# Patient Record
Sex: FEMALE | Race: BLACK OR AFRICAN AMERICAN | NOT HISPANIC OR LATINO | Employment: UNEMPLOYED | URBAN - METROPOLITAN AREA
[De-identification: names, ages, dates, MRNs, and addresses within clinical notes are randomized per-mention and may not be internally consistent; named-entity substitution may affect disease eponyms.]

---

## 2023-01-01 ENCOUNTER — HOSPITAL ENCOUNTER (INPATIENT)
Dept: DATA CONVERSION | Facility: HOSPITAL | Age: 63
LOS: 5 days | Discharge: HOME | End: 2023-09-29
Attending: EMERGENCY MEDICINE | Admitting: INTERNAL MEDICINE
Payer: COMMERCIAL

## 2023-01-01 VITALS — BODY MASS INDEX: 30.45 KG/M2 | WEIGHT: 194 LBS | HEIGHT: 67 IN

## 2023-01-01 DIAGNOSIS — R29.6 REPEATED FALLS: ICD-10-CM
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
## 2023-01-01 DIAGNOSIS — R52 PAIN, UNSPECIFIED: ICD-10-CM

## 2023-01-01 DIAGNOSIS — C79.49 SECONDARY MALIGNANT NEOPLASM OF OTHER PARTS OF NERVOUS SYSTEM (MULTI): ICD-10-CM

## 2023-01-01 LAB
ALBUMIN (G/DL) IN SER/PLAS: 3.6 G/DL (ref 3.4–5)
ALBUMIN (G/DL) IN SER/PLAS: 3.7 G/DL (ref 3.4–5)
ALBUMIN (G/DL) IN SER/PLAS: 3.8 G/DL (ref 3.4–5)
ANION GAP IN SER/PLAS: 12 MMOL/L (ref 10–20)
ANION GAP IN SER/PLAS: 14 MMOL/L (ref 10–20)
ANION GAP IN SER/PLAS: 17 MMOL/L (ref 10–20)
APPEARANCE, URINE: CLEAR
BASOPHILS (10*3/UL) IN BLOOD BY AUTOMATED COUNT: 0.03 X10E9/L (ref 0–0.1)
BASOPHILS/100 LEUKOCYTES IN BLOOD BY AUTOMATED COUNT: 0.2 % (ref 0–2)
BILIRUBIN, URINE: NEGATIVE
BLOOD, URINE: NEGATIVE
CALCIUM (MG/DL) IN SER/PLAS: 9.5 MG/DL (ref 8.6–10.6)
CALCIUM (MG/DL) IN SER/PLAS: 9.7 MG/DL (ref 8.6–10.6)
CALCIUM (MG/DL) IN SER/PLAS: 9.7 MG/DL (ref 8.6–10.6)
CARBON DIOXIDE, TOTAL (MMOL/L) IN SER/PLAS: 30 MMOL/L (ref 21–32)
CARBON DIOXIDE, TOTAL (MMOL/L) IN SER/PLAS: 30 MMOL/L (ref 21–32)
CARBON DIOXIDE, TOTAL (MMOL/L) IN SER/PLAS: 37 MMOL/L (ref 21–32)
CHLORIDE (MMOL/L) IN SER/PLAS: 97 MMOL/L (ref 98–107)
CHLORIDE (MMOL/L) IN SER/PLAS: 99 MMOL/L (ref 98–107)
CHLORIDE (MMOL/L) IN SER/PLAS: 99 MMOL/L (ref 98–107)
COLOR, URINE: YELLOW
CREATININE (MG/DL) IN SER/PLAS: 0.58 MG/DL (ref 0.5–1.05)
CREATININE (MG/DL) IN SER/PLAS: 0.63 MG/DL (ref 0.5–1.05)
CREATININE (MG/DL) IN SER/PLAS: 0.74 MG/DL (ref 0.5–1.05)
EOSINOPHILS (10*3/UL) IN BLOOD BY AUTOMATED COUNT: 0.1 X10E9/L (ref 0–0.7)
EOSINOPHILS/100 LEUKOCYTES IN BLOOD BY AUTOMATED COUNT: 0.7 % (ref 0–6)
ERYTHROCYTE DISTRIBUTION WIDTH (RATIO) BY AUTOMATED COUNT: 12.1 % (ref 11.5–14.5)
ERYTHROCYTE DISTRIBUTION WIDTH (RATIO) BY AUTOMATED COUNT: 12.4 % (ref 11.5–14.5)
ERYTHROCYTE DISTRIBUTION WIDTH (RATIO) BY AUTOMATED COUNT: 12.6 % (ref 11.5–14.5)
ERYTHROCYTE MEAN CORPUSCULAR HEMOGLOBIN CONCENTRATION (G/DL) BY AUTOMATED: 33.5 G/DL (ref 32–36)
ERYTHROCYTE MEAN CORPUSCULAR HEMOGLOBIN CONCENTRATION (G/DL) BY AUTOMATED: 34.8 G/DL (ref 32–36)
ERYTHROCYTE MEAN CORPUSCULAR HEMOGLOBIN CONCENTRATION (G/DL) BY AUTOMATED: 35.2 G/DL (ref 32–36)
ERYTHROCYTE MEAN CORPUSCULAR VOLUME (FL) BY AUTOMATED COUNT: 89 FL (ref 80–100)
ERYTHROCYTE MEAN CORPUSCULAR VOLUME (FL) BY AUTOMATED COUNT: 91 FL (ref 80–100)
ERYTHROCYTE MEAN CORPUSCULAR VOLUME (FL) BY AUTOMATED COUNT: 92 FL (ref 80–100)
ERYTHROCYTES (10*6/UL) IN BLOOD BY AUTOMATED COUNT: 4.99 X10E12/L (ref 4–5.2)
ERYTHROCYTES (10*6/UL) IN BLOOD BY AUTOMATED COUNT: 5.16 X10E12/L (ref 4–5.2)
ERYTHROCYTES (10*6/UL) IN BLOOD BY AUTOMATED COUNT: 5.42 X10E12/L (ref 4–5.2)
GFR FEMALE: >90 ML/MIN/1.73M2
GLUCOSE (MG/DL) IN SER/PLAS: 174 MG/DL (ref 74–99)
GLUCOSE (MG/DL) IN SER/PLAS: 183 MG/DL (ref 74–99)
GLUCOSE (MG/DL) IN SER/PLAS: 245 MG/DL (ref 74–99)
GLUCOSE, URINE: NEGATIVE MG/DL
HEMATOCRIT (%) IN BLOOD BY AUTOMATED COUNT: 44.6 % (ref 36–46)
HEMATOCRIT (%) IN BLOOD BY AUTOMATED COUNT: 47.5 % (ref 36–46)
HEMATOCRIT (%) IN BLOOD BY AUTOMATED COUNT: 49.2 % (ref 36–46)
HEMOGLOBIN (G/DL) IN BLOOD: 15.5 G/DL (ref 12–16)
HEMOGLOBIN (G/DL) IN BLOOD: 15.9 G/DL (ref 12–16)
HEMOGLOBIN (G/DL) IN BLOOD: 17.3 G/DL (ref 12–16)
IMMATURE GRANULOCYTES/100 LEUKOCYTES IN BLOOD BY AUTOMATED COUNT: 1.6 % (ref 0–0.9)
KETONES, URINE: NEGATIVE MG/DL
LEUKOCYTE ESTERASE, URINE: NEGATIVE
LEUKOCYTES (10*3/UL) IN BLOOD BY AUTOMATED COUNT: 14 X10E9/L (ref 4.4–11.3)
LEUKOCYTES (10*3/UL) IN BLOOD BY AUTOMATED COUNT: 19.4 X10E9/L (ref 4.4–11.3)
LEUKOCYTES (10*3/UL) IN BLOOD BY AUTOMATED COUNT: 19.8 X10E9/L (ref 4.4–11.3)
LYMPHOCYTES (10*3/UL) IN BLOOD BY AUTOMATED COUNT: 0.96 X10E9/L (ref 1.2–4.8)
LYMPHOCYTES/100 LEUKOCYTES IN BLOOD BY AUTOMATED COUNT: 6.9 % (ref 13–44)
MAGNESIUM (MG/DL) IN SER/PLAS: 2.12 MG/DL (ref 1.6–2.4)
MAGNESIUM (MG/DL) IN SER/PLAS: 2.15 MG/DL (ref 1.6–2.4)
MONOCYTES (10*3/UL) IN BLOOD BY AUTOMATED COUNT: 0.67 X10E9/L (ref 0.1–1)
MONOCYTES/100 LEUKOCYTES IN BLOOD BY AUTOMATED COUNT: 4.8 % (ref 2–10)
NEUTROPHILS (10*3/UL) IN BLOOD BY AUTOMATED COUNT: 11.97 X10E9/L (ref 1.2–7.7)
NEUTROPHILS/100 LEUKOCYTES IN BLOOD BY AUTOMATED COUNT: 85.8 % (ref 40–80)
NITRITE, URINE: NEGATIVE
NRBC (PER 100 WBCS) BY AUTOMATED COUNT: 0 /100 WBC (ref 0–0)
PATIENT TEMPERATURE: 37 DEGREES C
PH, URINE: 6 (ref 5–8)
PHOSPHATE (MG/DL) IN SER/PLAS: 2.9 MG/DL (ref 2.5–4.9)
PHOSPHATE (MG/DL) IN SER/PLAS: 3.5 MG/DL (ref 2.5–4.9)
PHOSPHATE (MG/DL) IN SER/PLAS: 3.8 MG/DL (ref 2.5–4.9)
PLATELETS (10*3/UL) IN BLOOD AUTOMATED COUNT: 257 X10E9/L (ref 150–450)
PLATELETS (10*3/UL) IN BLOOD AUTOMATED COUNT: 276 X10E9/L (ref 150–450)
PLATELETS (10*3/UL) IN BLOOD AUTOMATED COUNT: 311 X10E9/L (ref 150–450)
POCT ANION GAP, VENOUS: 4 MMOL/L (ref 10–25)
POCT BASE EXCESS, VENOUS: 12.6 MMOL/L (ref -2–3)
POCT CHLORIDE, VENOUS: 98 MMOL/L (ref 98–107)
POCT GLUCOSE, VENOUS: 257 MG/DL (ref 74–99)
POCT GLUCOSE: 174 MG/DL (ref 74–99)
POCT GLUCOSE: 271 MG/DL (ref 74–99)
POCT GLUCOSE: 281 MG/DL (ref 74–99)
POCT HCO3, VENOUS: 39.1 MMOL/L (ref 22–26)
POCT HEMATOCRIT, VENOUS: 46 % (ref 36–46)
POCT HEMOGLOBIN, VENOUS: 15.4 G/DL (ref 12–16)
POCT IONIZED CALCIUM, VENOUS: 1.2 MMOL/L (ref 1.1–1.33)
POCT LACTATE, VENOUS: 1.4 MMOL/L (ref 0.4–2)
POCT OXY HEMOGLOBIN, VENOUS: 44.6 % (ref 45–75)
POCT PCO2, VENOUS: 55 MMHG (ref 41–51)
POCT PH, VENOUS: 7.46 (ref 7.33–7.43)
POCT PO2, VENOUS: 33 MMHG (ref 35–45)
POCT POTASSIUM, VENOUS: 3.8 MMOL/L (ref 3.5–5.3)
POCT SO2, VENOUS: 46 % (ref 45–75)
POCT SODIUM, VENOUS: 137 MMOL/L (ref 136–145)
POTASSIUM (MMOL/L) IN SER/PLAS: 4.4 MMOL/L (ref 3.5–5.3)
POTASSIUM (MMOL/L) IN SER/PLAS: 4.5 MMOL/L (ref 3.5–5.3)
POTASSIUM (MMOL/L) IN SER/PLAS: 4.5 MMOL/L (ref 3.5–5.3)
PROTEIN, URINE: NEGATIVE MG/DL
SODIUM (MMOL/L) IN SER/PLAS: 139 MMOL/L (ref 136–145)
SODIUM (MMOL/L) IN SER/PLAS: 141 MMOL/L (ref 136–145)
SODIUM (MMOL/L) IN SER/PLAS: 141 MMOL/L (ref 136–145)
SPECIFIC GRAVITY, URINE: 1.01 (ref 1–1.03)
UREA NITROGEN (MG/DL) IN SER/PLAS: 16 MG/DL (ref 6–23)
UREA NITROGEN (MG/DL) IN SER/PLAS: 17 MG/DL (ref 6–23)
UREA NITROGEN (MG/DL) IN SER/PLAS: 18 MG/DL (ref 6–23)
UROBILINOGEN, URINE: <2 MG/DL (ref 0–1.9)

## 2023-01-01 RX ORDER — PANTOPRAZOLE SODIUM 40 MG/1
40 TABLET, DELAYED RELEASE ORAL DAILY
Status: DISCONTINUED | OUTPATIENT
Start: 2023-01-01 | End: 2023-01-01 | Stop reason: HOSPADM

## 2023-01-01 RX ORDER — INSULIN LISPRO 100 [IU]/ML
6 INJECTION, SOLUTION INTRAVENOUS; SUBCUTANEOUS
Status: DISCONTINUED | OUTPATIENT
Start: 2023-01-01 | End: 2023-01-01

## 2023-01-01 RX ORDER — SULFAMETHOXAZOLE AND TRIMETHOPRIM 800; 160 MG/1; MG/1
160 TABLET ORAL 3 TIMES WEEKLY
Status: DISCONTINUED | OUTPATIENT
Start: 2023-10-02 | End: 2023-01-01 | Stop reason: HOSPADM

## 2023-01-01 RX ORDER — HYDROCHLOROTHIAZIDE 25 MG/1
12.5 TABLET ORAL DAILY
Status: DISCONTINUED | OUTPATIENT
Start: 2023-01-01 | End: 2023-01-01 | Stop reason: HOSPADM

## 2023-01-01 RX ORDER — POLYETHYLENE GLYCOL 3350 17 G/17G
34 POWDER, FOR SOLUTION ORAL 2 TIMES DAILY
Status: DISCONTINUED | OUTPATIENT
Start: 2023-01-01 | End: 2023-01-01 | Stop reason: HOSPADM

## 2023-01-01 RX ORDER — INSULIN LISPRO 100 [IU]/ML
0-15 INJECTION, SOLUTION INTRAVENOUS; SUBCUTANEOUS EVERY 6 HOURS
Status: DISCONTINUED | OUTPATIENT
Start: 2023-01-01 | End: 2023-01-01

## 2023-01-01 RX ORDER — SENNOSIDES 8.6 MG/1
1 TABLET ORAL 2 TIMES DAILY PRN
Status: DISCONTINUED | OUTPATIENT
Start: 2023-01-01 | End: 2023-01-01 | Stop reason: HOSPADM

## 2023-01-01 RX ORDER — OLANZAPINE 10 MG/2ML
10 INJECTION, POWDER, FOR SOLUTION INTRAMUSCULAR ONCE AS NEEDED
Status: DISCONTINUED | OUTPATIENT
Start: 2023-01-01 | End: 2023-01-01 | Stop reason: HOSPADM

## 2023-01-01 RX ORDER — LISINOPRIL 10 MG/1
10 TABLET ORAL DAILY
Status: DISCONTINUED | OUTPATIENT
Start: 2023-01-01 | End: 2023-01-01 | Stop reason: HOSPADM

## 2023-01-01 RX ORDER — IBUPROFEN 200 MG
1 TABLET ORAL DAILY
Status: DISCONTINUED | OUTPATIENT
Start: 2023-01-01 | End: 2023-01-01 | Stop reason: HOSPADM

## 2023-01-01 RX ORDER — FUROSEMIDE 40 MG/1
40 TABLET ORAL DAILY
Status: DISCONTINUED | OUTPATIENT
Start: 2023-01-01 | End: 2023-01-01 | Stop reason: HOSPADM

## 2023-01-01 RX ORDER — DEXAMETHASONE 4 MG/1
6 TABLET ORAL EVERY 12 HOURS SCHEDULED
Status: DISCONTINUED | OUTPATIENT
Start: 2023-01-01 | End: 2023-01-01 | Stop reason: HOSPADM

## 2023-01-01 RX ORDER — ATORVASTATIN CALCIUM 40 MG/1
40 TABLET, FILM COATED ORAL EVERY 24 HOURS
Status: DISCONTINUED | OUTPATIENT
Start: 2023-01-01 | End: 2023-01-01 | Stop reason: HOSPADM

## 2023-01-01 RX ORDER — DEXTROSE 50 % IN WATER (D50W) INTRAVENOUS SYRINGE
25
Status: DISCONTINUED | OUTPATIENT
Start: 2023-01-01 | End: 2023-01-01 | Stop reason: HOSPADM

## 2023-01-01 RX ORDER — ACETAMINOPHEN 325 MG/1
650 TABLET ORAL EVERY 4 HOURS PRN
Status: DISCONTINUED | OUTPATIENT
Start: 2023-01-01 | End: 2023-01-01 | Stop reason: HOSPADM

## 2023-01-01 RX ORDER — QUETIAPINE FUMARATE 25 MG/1
12.5 TABLET, FILM COATED ORAL 3 TIMES DAILY PRN
Status: DISCONTINUED | OUTPATIENT
Start: 2023-01-01 | End: 2023-01-01 | Stop reason: HOSPADM

## 2023-09-30 NOTE — PROGRESS NOTES
"        Service: Hematology - Oncology     Subjective Data:   NADER MAHMOOD is a 63 year old Female who is Hospital Day # 2.     No acute events overnight.  Patient seen and evaluated this morning, no acute distress sitting in bed.  She appears confused, is ANO x2 on exam.  She reports that at home she was feeling \"sore\" which  is why she was brought back into the hospital.  Story was corroborated with family on rounds, they state she had 2 falls in the bathroom, her  was unable to take care off her medications, and that she had an approximately 5-minute episode of right  arm shaking which prompted them to call the EMS.  Patient denies any chest pain, shortness of breath, abdominal pain, nausea or vomiting.  Continues to endorse weakness in right upper and lower extremity.  Denies hitting her head, or any headaches.    Objective Data:     Objective Information:      T   P  R  BP   MAP  SpO2   Value  36.6  63  18  126/76      99%  Date/Time 9/25 10:52 9/25 10:52 9/25 10:52 9/25 10:52    9/25 10:52  Range  (35.6C - 36.9C )  (60 - 73 )  (16 - 18 )  (126 - 174 )/ (71 - 90 )    (93% - 100% )  Highest temp of 36.9 C was recorded at 9/25 1:21      Pain reported at 9/25 8:41: 0  Pain reported at 9/25 8:41: 0 = None    Physical Exam Narrative:  ·  Physical Exam:    Constitutional:  Sitting in bed in NAD, awake  CV: RRR, no murmurs, rubs, or gallops  Pulm: CTAB, no increased WOB  GI: abd soft, NT, ND  Ext: bilateral LE without pitting edema  Neuro: 0/5 strength RUE, 5/5 LUE, 4/5 RLE , 5/5 LLE. Sensation intact bilaterally, cranial nerves intact.   Psych: affect appropriate, ANO x2,  does not know date and was perseverating on this throughout the interview afterwards, redirectable with questioning    Recent Lab Results:    Results:    CBC: 9/24/2023 17:00              \     Hgb     /                              \     15.5       /  WBC  ----------------  Plt               14.0 H    ----------------    311            "   /     Hct     \                              /     44.6       \            RBC: 4.99     MCV: 89     Neutrophil %: 85.8      RFP: 9/24/2023 17:00  NA+        Cl-     BUN  /                         141    97 L   16  /  --------------------------------  Glucose                ---------------------------  245 H    K+     HCO3-   Creat \                         4.5    37 H   0.74  \  Calcium : 9.7Anion Gap : 12          Albumin : 3.8     Phos : 2.9      Assessment and Plan:   Code Status:  ·  Code Status Full Code     Assessment:    63F PMHx HTN, DM2, HLD, tobacco use who was recently admitted for workup of suspected new lung malignancy (prelim biopsy result NSCLC)  and 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 S/p LP during prior admission to assess for leptomeningeal disease. Discharged on dexamethasone 4q8, will continue;  likely needs PT/OT assessment and either discharge with confirmed homecare or SNF placement.     Updates 9/25:  - Endocrinology consulted for management of blood glucose in setting of steroid taper, appreciate recommendations  - Radiation oncology consulted for continuation of radiation to spine and brain, appreciate recommendations  - Patient scheduled for radiation at 1330  - Neurology consulted for potential seizures in setting of brain metastases, appreciate recommendations  - Video continuous EEG ordered    #Brain metastatic lesions  #Likely RUL NSCLC  #Concern for seizures  :: 9/12 OSH CT head w/o contrast stable intracranial numerous hypodense lesions throughout brain with surrounding edema suspicious for hyperdense/hemorrhagic metastasis  ::9/12 OSH CTAP  2.8 x 2.7 x 3.3 cm cavitary mass with geographic rim of ground-glass attenuation in right upper lobe suspicious for primary lung neoplasm. Several mildly prominent subcentimeter R hilar lymph nodes.  ::9/13 OSH MRI  numerous  peripherally enhancing, intra-axial masses in bilateral cerebral hemispheres and cerebellar hemispheres consistent with metastatic disease largest measuring up to 2cm in size in R occipital and L parietal lobes.  :: 0/5 RUE, 5/5 LUE, 4/5 RLE, 5/5 LLE strength; sensation and cranial nerves intact  :: 9/17 MRI spine showed heterogeneous enhancing cord mass at T1-2 appears  intramedullary and is highly suspicious for a spinal cord metastasis and probable osseous metastases, most prominently at T8  :: s/p bronchoscopic lung biopsy, path suggestive of adenocarcinoma of lung, although cell block contained very little tumor and per report is inadequate for molecular studies. PDL1 stain pending.   :: LP 9/21 significant for protein 49 (H), Glucose 115 (H), RBC 10 (); cultures NGTD/in progress, IgG levels canceled; cytology with few lymphocytes and monocytes but no malignant cells seen  -  Radiation oncology consulted for inpatient radiation  -  PT/OT ordered for falls, pt may require SNF  -  Resumed dexamethasone 4q8, plan to taper to BID on 9/28        -     Tylenol 975mg q8  -  If any worsening neuro deficits consider CT head for evaluation of expanding hemorrhagic lesions  -  Hold off any anticoag/dvt prophylaxis at this time  -  Continuous video EEG ordered, neurology consulted    #HTN  :: On home Lisinopril 10mg daily, HCTZ 12.5mg daily, and Lasix 40mg daily  - Continue Lisinopril 10mg and HCTZ 12.5mg   - hold Lasix PO 40mg (bicarb elevation, likely contraction alkalosis trend bicarb and resume if downtrending    #T2DM  :: On home metformin-sitagliptin  :: Hb A1c 6.3%  - c/w NPH 20u BID  - Diabetic diet  - Monitor glucose levels  - Endocrinology consulted for management of blood sugars in setting of dexamethasone therapy, appreciate recommendations    #HLD  :: On home atorvastatin 40mg daily  - Continue atorvastatin    #Tobacco use  - Nicotine patch    #Constipation  - Miralax 34mg BID  - Senna BID    F: PRN  E:  PRN  N: Diabetic diet  A: PIV  GI: Protonix 40 daily  DVT: SCDS  NOK:  ольга   Code status Full code    Melchor Abraham MD  PGY1 Neurology    Case discussed with on-call attending.      Attestation:   Note Completion:  I am a:  Resident/Fellow   Attending Attestation I saw and evaluated the patient.  I personally obtained the key and critical portions of the history and physical exam or was physically present for key and  critical portions performed by the resident/fellow. I reviewed the resident/fellow?s documentation and discussed the patient with the resident/fellow.  I agree with the resident/fellow?s medical decision making as documented in their note  with the exception/addition of the following:    I personally evaluated the patient on 25-Sep-2023   Comments/ Additional Findings    TEACHING ATTENDING ATTESTATION    I saw this patient with the Resident, total face to face time was 65 minutes  History and physical examination were reviewed in the presence of the patient. More than half of this time was spent on coordination of care and patient education. I personally obtained the key and critical portions of the H&P.  The pertinent aspects of the history were: As above.  The pertinent aspects of the examination were: As above.  I discussed diagnosis and treatment plan with the resident and the diagnosis and plan as documented reflects accurately my opinion.  I wish to emphasize: admitted o/n for AMS and seizure.           Electronic Signatures:  Matthew Gomes)  (Signed 25-Sep-2023 17:01)   Authored: Note Completion   Co-Signer: Service, Subjective Data, Objective Data, Assessment and Plan, Note Completion  Melchor Abraham (Resident))  (Signed 25-Sep-2023 15:03)   Authored: Service, Subjective Data, Objective Data, Assessment  and Plan, Note Completion      Last Updated: 25-Sep-2023 17:01 by Matthew Gomes)

## 2023-09-30 NOTE — PROGRESS NOTES
Service: Hematology - Oncology     Subjective Data:   NADER MAHMOOD is a 63 year old Female who is Hospital Day # 4.     Rapid response was called this AM for worsening mental status and persistent N/V. On exam she was responsive only to sternal rub. Spontaneously moving her LUE and LLE. Known deficits on right side. CT  Head was completed showing worsening of hemorrhagic metastatic brain disease. Family notified.    On rounds, discussion of hospice was completed with  at bedside, hospice consult placed.  We will follow-up with patient in p.m.    Objective Data:     Objective Information:      T   P  R  BP   MAP  SpO2   Value  36.9  76  16  160/91      100%  Date/Time 9/27 5:22 9/27 5:22 9/27 5:22 9/27 5:22    9/27 5:22  Range  (35.9C - 36.9C )  (57 - 96 )  (16 - 18 )  (135 - 164 )/ (76 - 96 )    (97% - 100% )  Highest temp of 36.9 C was recorded at 9/27 1:17      Pain reported at 9/27 8:09: sleeping    Physical Exam Narrative:  ·  Physical Exam:    Constitutional:  Laying in bed, dry heaving/vomiting  CV: RRR, no murmurs, rubs, or gallops  Pulm: CTAB, no increased WOB  GI: abd soft, NT, ND  Ext: bilateral LE without pitting edema  Neuro: 0/5 strength RUE. Spontaneously moving left upper and lower extremities.  Responsive only to sternal rub.  Left gaze deviation  Psych: ANOx0.    Recent Lab Results:    Results:    CBC: 9/24/2023 17:00              \     Hgb     /                              \     15.5       /  WBC  ----------------  Plt               14.0 H    ----------------    311              /     Hct     \                              /     44.6       \            RBC: 4.99     MCV: 89     Neutrophil %: 85.8      RFP: 9/24/2023 17:00  NA+        Cl-     BUN  /                         141    97 L   16  /  --------------------------------  Glucose                ---------------------------  245 H    K+     HCO3-   Creat \                         4.5    37 H   0.74  \  Calcium :  9.7Anion Gap : 12          Albumin : 3.8     Phos : 2.9      Assessment and Plan:   Code Status:  ·  Code Status Full Code     Assessment:    63F PMHx HTN, DM2, HLD, tobacco use who was recently admitted for workup of suspected new lung malignancy (prelim biopsy result NSCLC) and hemorrhagic brain met s/p initiation  of radiation therapy 9/22, discharged 9/23, now readmitted with persistent weakness of RUE and RLE. Worsening metastatic brain disease. No neurosurgical intervention indicated. Appropriate for hospice  care at this time.    Updates 9/27:  - Rapid response this AM  - Repeat CT head shows worsening of hemorrhagic metastatic brain disease  - Neurosurgery consulted for recommendations on intervention - no indication for surgery  - Hospice consulted    Updates 9/26:  - Video continuous EEG overnight, preliminary report showing moderate diffuse encephalopathy, final report pending  - Radiation this a.m.  - Dexamethasone changed to 6 mg twice daily  - Urinalysis today  - Seroquel 12.5 as needed for agitation  - Nicotine patch to increase to 21 mg  - PT to evaluate patient, pending recommendations will pursue SNF options with family    Updates 9/25:  - Endocrinology consulted for management of blood glucose in setting of steroid taper, appreciate recommendations  - Radiation oncology consulted for continuation of radiation to spine and brain, appreciate recommendations  - Patient scheduled for radiation at 1330  - Neurology consulted for potential seizures in setting of brain metastases, appreciate recommendations  - Video continuous EEG ordered    #Brain metastatic lesions  #Likely RUL NSCLC  #Concern for seizures  :: 9/12 OSH CT head w/o contrast stable intracranial numerous hypodense lesions throughout brain with surrounding edema suspicious for hyperdense/hemorrhagic metastasis  ::9/12 OSH CTAP  2.8 x 2.7 x 3.3 cm cavitary mass with geographic rim of ground-glass attenuation in right upper lobe suspicious  for primary lung neoplasm. Several mildly prominent subcentimeter R hilar lymph nodes.  ::9/13 OSH MRI  numerous peripherally enhancing, intra-axial masses in bilateral cerebral hemispheres and cerebellar hemispheres consistent with metastatic disease largest measuring up to 2cm in size in R occipital and L parietal lobes.  :: 0/5 RUE, 5/5 LUE, 4/5 RLE, 5/5 LLE strength; sensation and cranial nerves intact  :: 9/17 MRI spine showed heterogeneous enhancing cord mass at T1-2 appears  intramedullary and is highly suspicious for a spinal cord metastasis and probable osseous metastases, most prominently at T8  :: s/p bronchoscopic lung biopsy, path suggestive of adenocarcinoma of lung, although cell block contained very little tumor and per report is inadequate for molecular studies. PDL1 stain pending.   :: LP 9/21 significant for protein 49 (H), Glucose 115 (H), RBC 10 (); cultures NGTD/in progress, IgG levels canceled; cytology with few lymphocytes and monocytes but no malignant cells seen  -  Radiation oncology consulted for inpatient radiation  -  PT/OT ordered for falls, pt may require SNF  -  Resumed dexamethasone 4q8, plan to taper to BID on 9/28        -     Tylenol 975mg q8  -  If any worsening neuro deficits consider CT head for evaluation of expanding hemorrhagic lesions  -  Hold off any anticoag/dvt prophylaxis at this time  -  Continuous video EEG ordered, neurology consulted    #HTN  :: On home Lisinopril 10mg daily, HCTZ 12.5mg daily, and Lasix 40mg daily  - Continue Lisinopril 10mg and HCTZ 12.5mg   - hold Lasix PO 40mg (bicarb elevation, likely contraction alkalosis trend bicarb and resume if downtrending    #T2DM  :: On home metformin-sitagliptin  :: Hb A1c 6.3%  - c/w NPH 20u BID  - Diabetic diet  - Monitor glucose levels  - Endocrinology consulted for management of blood sugars in setting of dexamethasone therapy, appreciate recommendations    #HLD  :: On home atorvastatin 40mg daily  - Continue  atorvastatin    #Tobacco use  - Nicotine patch    #Constipation  - Miralax 34mg BID  - Senna BID    F: PRN  E: PRN  N: Diabetic diet  A: PIV  GI: Protonix 40 daily  DVT: SCDS  NOK:  ольга   Code status Full code    Melchor Abraham MD  PGY1 Neurology    Case discussed with on-call attending.      Attestation:   Note Completion:  I am a:  Resident/Fellow   Attending Attestation I saw and evaluated the patient.  I personally obtained the key and critical portions of the history and physical exam or was physically present for key and  critical portions performed by the resident/fellow. I reviewed the resident/fellow?s documentation and discussed the patient with the resident/fellow.  I agree with the resident/fellow?s medical decision making as documented in their note  with the exception/addition of the following:    I personally evaluated the patient on 27-Sep-2023   Comments/ Additional Findings    TEACHING ATTENDING ATTESTATION    I saw this patient with the Resident, total face to face time was 65 minutes  History and physical examination were reviewed in the presence of the patient. More than half of this time was spent on coordination of care and patient education. I personally obtained the key and critical portions of the H&P.  The pertinent aspects of the history were: As above.  The pertinent aspects of the examination were: As above.  I discussed diagnosis and treatment plan with the resident and the diagnosis and plan as documented reflects accurately my opinion.  I wish to emphasize: patient with acute worsening of mental status changes overnight. Now essentially unresponsive. brain MRI shows progression of brain mets with hemorrhage/edema. plan is to transition patient to supportive care/hospice given very guarded  prognosis. Neurosurgery has seen her and no surgical intervention indicated. We have spoken with spouse and son.          Electronic Signatures:  Matthew Gomes)   (Signed 27-Sep-2023 13:21)   Co-Signer: Service, Subjective Data, Objective Data,  Assessment and Plan, Note Completion  Melchor Abraham (Resident))  (Signed 27-Sep-2023 11:39)   Authored: Service, Subjective Data, Objective Data, Assessment  and Plan, Note Completion      Last Updated: 27-Sep-2023 13:21 by Matthew Gomes)

## 2023-09-30 NOTE — PROGRESS NOTES
Service: Hematology - Oncology     Subjective Data:   NADER MAHMOOD is a 63 year old Female who is Hospital Day # 6.     Patient is comfort care today. They will reach out to [facility] for inpatient hospice placement.    Objective Data:     Objective Information:      T   P  R  BP   MAP  SpO2   Value  36.9  86  18  162/94      100%  Date/Time 9/29 8:52 9/29 8:52 9/29 8:52 9/29 8:52    9/29 8:52  Range  (36.5C - 37C )  (69 - 95 )  (16 - 18 )  (134 - 162 )/ (86 - 94 )    (91% - 100% )  Highest temp of 37 C was recorded at 9/28 21:05      Pain reported at 9/29 7:22: 0  Pain reported at 9/29 7:22: sleeping    Physical Exam Narrative:  ·  Physical Exam:    Constitutional:  NAD, unresponsive  CV: RRR, no murmurs, rubs, or gallops  Pulm: no increased WOB  GI: ND  Ext: bilateral LE without pitting edema  Neuro: Unable to assess. Spontaneously moving left side of body.  Psych: ANOx0.    Recent Lab Results:    Results:    CBC: 9/27/2023 07:00              \     Hgb     /                              \     17.3 H    /  WBC  ----------------  Plt               19.4 H    ----------------    257              /     Hct     \                              /     49.2 H    \            RBC: 5.42 H    MCV: 91           RFP: 9/27/2023 07:00  NA+        Cl-     BUN  /                         141    99    17  /  --------------------------------  Glucose                ---------------------------  183 H    K+     HCO3-   Creat \                         4.5    30    0.58  \  Calcium : 9.5Anion Gap : 17          Albumin : 3.7     Phos : 3.8      Assessment and Plan:   Code Status:  ·  Code Status DNAR - Comfort Measures Only     Assessment:    63F PMHx HTN, DM2, HLD, tobacco use who was recently admitted for workup of suspected new lung malignancy (prelim biopsy result NSCLC) and hemorrhagic brain met s/p initiation  of radiation therapy 9/22, discharged 9/23, now readmitted with persistent weakness of RUE  and RLE. Worsening metastatic brain disease. No neurosurgical intervention indicated. Appropriate for hospice  care at this time.    Updates 9/28:  - Comfort care today  -Added Robinul and Zofran for symptomatic control    Updates 9/27:  - Rapid response this AM  - Repeat CT head shows worsening of hemorrhagic metastatic brain disease  - Neurosurgery consulted for recommendations on intervention - no indication for surgery  - Hospice consulted    Updates 9/26:  - Video continuous EEG overnight, preliminary report showing moderate diffuse encephalopathy, final report pending  - Radiation this a.m.  - Dexamethasone changed to 6 mg twice daily  - Urinalysis today  - Seroquel 12.5 as needed for agitation  - Nicotine patch to increase to 21 mg  - PT to evaluate patient, pending recommendations will pursue SNF options with family    Updates 9/25:  - Endocrinology consulted for management of blood glucose in setting of steroid taper, appreciate recommendations  - Radiation oncology consulted for continuation of radiation to spine and brain, appreciate recommendations  - Patient scheduled for radiation at 1330  - Neurology consulted for potential seizures in setting of brain metastases, appreciate recommendations  - Video continuous EEG ordered    #Brain metastatic lesions  #Likely RUL NSCLC  #Concern for seizures  :: 9/12 OSH CT head w/o contrast stable intracranial numerous hypodense lesions throughout brain with surrounding edema suspicious for hyperdense/hemorrhagic metastasis  ::9/12 OSH CTAP  2.8 x 2.7 x 3.3 cm cavitary mass with geographic rim of ground-glass attenuation in right upper lobe suspicious for primary lung neoplasm. Several mildly prominent subcentimeter R hilar lymph nodes.  ::9/13 OSH MRI  numerous peripherally enhancing, intra-axial masses in bilateral cerebral hemispheres and cerebellar hemispheres consistent with metastatic disease largest measuring up to 2cm in size in R occipital and L parietal  lobes.  :: 0/5 RUE, 5/5 LUE, 4/5 RLE, 5/5 LLE strength; sensation and cranial nerves intact  :: 9/17 MRI spine showed heterogeneous enhancing cord mass at T1-2 appears  intramedullary and is highly suspicious for a spinal cord metastasis and probable osseous metastases, most prominently at T8  :: s/p bronchoscopic lung biopsy, path suggestive of adenocarcinoma of lung, although cell block contained very little tumor and per report is inadequate for molecular studies. PDL1 stain pending.   :: LP 9/21 significant for protein 49 (H), Glucose 115 (H), RBC 10 (); cultures NGTD/in progress, IgG levels canceled; cytology with few lymphocytes and monocytes but no malignant cells seen  - Holding all treatment    #HTN  :: On home Lisinopril 10mg daily, HCTZ 12.5mg daily, and Lasix 40mg daily  - Holding all treatment    #T2DM  :: On home metformin-sitagliptin  :: Hb A1c 6.3%  - Holding all treatment    #HLD  :: On home atorvastatin 40mg daily  - Holding all treatment    #Tobacco use  - Continue Nicotine patch as part of comfort care orders    #Constipation  - Holding all treatmet    F: PRN  E: PRN  N: NPO  GI: not indicated  DVT: not indicated  NOK:  ольга   Code status: DNR/DNI comfort care    Melchor Abraham MD  PGY1 Neurology    Case discussed with on-call attending.      Attestation:   Note Completion:  I am a:  Resident/Fellow   Attending Attestation I saw and evaluated the patient.  I personally obtained the key and critical portions of the history and physical exam or was physically present for key and  critical portions performed by the resident/fellow. I reviewed the resident/fellow?s documentation and discussed the patient with the resident/fellow.  I agree with the resident/fellow?s medical decision making as documented in their note  with the exception/addition of the following:    I personally evaluated the patient on 29-Sep-2023   Comments/ Additional Findings    TEACHING ATTENDING  ATTESTATION    I saw this patient with the Resident, total face to face time was 65 minutes  History and physical examination were reviewed in the presence of the patient. More than half of this time was spent on coordination of care and patient education. I personally obtained the key and critical portions of the H&P.  The pertinent aspects of the history were: As above.  The pertinent aspects of the examination were: As above.  I discussed diagnosis and treatment plan with the resident and the diagnosis and plan as documented reflects accurately my opinion.  I wish to emphasize: patient transitioned to comfort care.  awaiting inpatient hospice.          Electronic Signatures:  Matthew Gomes)  (Signed 29-Sep-2023 20:29)   Authored: Note Completion   Co-Signer: Service, Subjective Data, Objective Data, Assessment and Plan, Note Completion  Melchor Abraham (Resident))  (Signed 29-Sep-2023 15:35)   Authored: Service, Subjective Data, Objective Data, Assessment  and Plan, Note Completion      Last Updated: 29-Sep-2023 20:29 by Matthew Gomes)

## 2023-09-30 NOTE — H&P
History of Present Illness:   HPI:    63F PMHx suspected new lung cancer with brain metastasis, HTN, DM2, HLD, tobacco use who was discharged 9/23 after admission for workup  of hemorrhagic brain mets s/p initiation of radiation 9/22, presenting with persistent weakness and an inability to care for herself.     Hospital course of previous admission as below:     Cristiane Harvey is a 63 year old female with a hx of htn, dm2, hld, tobacco use who presents as a transfer from [facility] for likely lung cancer with brain mets. Hemorrhagic lesions as per report that were stable on multiple scans. Presented with 0/5 RUE,  5/5 LUE, 4/5 RLE, and 5/5 LLE strength with intact sensation and cranial nerves.    OSH imaging:    - 9/12 OSH CT head w/o contrast stable intracranial numerous hypodense lesions throughout brain with surrounding edema suspicious for hyperdense/hemorrhagic metastasis  - 9/12 OSH CTAP  2.8 x 2.7 x 3.3 cm cavitary mass with geographic rim of ground-glass attenuation in right upper lobe suspicious for primary lung neoplasm. Several mildly prominent subcentimeter R hilar lymph nodes.  - 9/13 OSH MRI  numerous peripherally enhancing, intra-axial masses in bilateral cerebral hemispheres and cerebellar hemispheres consistent with metastatic disease largest measuring up to 2cm in size in R occipital and L parietal lobes.      Started on Dexamethasone 4mg IV q6hr and neurosurgery was consulted who recommended no acute invention at the time. Endocrinology consulted for uncontrolled sugars on dexamethasone, A1c found to be 6.3%. Radiation oncology consulted with recommendations  for MRI pan-spine showing heterogeneous enhancing cord mass at T1-2 highly suspicious for spinal cord metastasis and probable osseous metastases most prominent at T8. Relieved lung biopsy via bronchoscopy with preliminary results showing non-small cell  carcinoma of the lung, further results pending.       Discussed at neuro tumor boards and  determined LP would be useful for assessing for possible leptomeningeal disease as well as liquid biopsy for molecular profiling which was obtained. LP significant for CSF protein 49, glucose 115, and RBC 10; remaining  results pending. Radiation oncology recommended 5 total treatments of the spine and 10 total whole brain radiation treatments. Patient received first spine radiation treatment inpatient 9/22 and is scheduled to follow up with radiation oncology outpatient  for further treatment.     On discharge 9/23 patient remained at baseline strength at admission without improvement. Discharged on five days of dexamethasone PO 4mg BID with plans for further taper to 2mg BID in the outpatient setting. Patient also discharged with bactrim PJP ppx  and Pantoprazole GI ppx while on steroid. Scheduled to follow up with Dr. Rodríguez next week as well as radiation oncology for treatment.      On my assessment, pt reports that she went home and her  tried to help care for her but was unable to help her enough. She was meant to have homecare start this week. She endorsed multiple falls since discharge and complains of R hip pain.    Of note, LP results show no malignant cells, and cytology from lung biopsy is notable for adenocarcinoma, likely primary origin of lung, with insufficient tumor in biopsy for molecular testing. PDL1 stain is pending.     ED Course:   VS: T 35.6 HR 70 RR 16 SpO2 98% on RA /71  CBC 14/15.5/311  /4.5/97/37/16/0.74/245 (last bicarb 28 on 9/23)  Albumin 3.8  VBG 7.46/55/33, lact 1.4   XR shoulder: no acute fracture or malalignment   XR R hip: no acute radiographic findings or degenerative changes  CXR: no acute cardiopulmonary process, redemonstration of cavitary mass of RUL zone better evaluated on CT  CTH and C spine noncon: diffuse hyderdense mets not measurably changed  in size or number compared to prior exam. Normal exam of c-spine, no evidence of fracture, subluxation, bony  canal stenosis or bony foraminal  narrowing.   Interventions: dex 4 x 1     Past Med Hx: Hypertension, DM2, hyperlipidemia  Past Surg Hx: Hysterectomy  Social Hx: Current cigarette smoker ½ pdd.  Drinks alcohol occasionally.  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  Mother has hypertension.  Meds: see med rec   allergies: NKDA    10 pt ROS reviewed and negative except as per HPI.    Comorbidities:   Comorbidites:  ·  Comorbid Conditions hypertension            Allergies:  ·  No Known Allergies :     Medications Prior to Admission:     furosemide 40 mg oral tablet: 1 tab(s) orally once a day  lisinopril-hydrochlorothiazide 10 mg-12.5 mg oral tablet: 1 tab(s) orally once a day  atorvastatin 40 mg oral tablet: 1 tab(s) orally once a day (at bedtime)  Collagen+C+Zinc: 1 tab(s) orally once a day  acetaminophen 325 mg oral tablet: 2 tab(s) orally every 4 hours, As needed, pain 1-10 or temp >99.5F  sitagliptin-metformin 50 mg-500 mg oral tablet, extended release: 1 tab(s) orally once a day. HOLD FOR 48 HOURS POST CT WITH CONTRAST   nicotine 14 mg/24 hr transdermal film, extended release: 1 patch transdermal every 24 hours  sulfamethoxazole-trimethoprim 800 mg-160 mg oral tablet: 1 tab(s) orally once a day -.ADOD - .Meds to Beds - .Patient Location  - (Every 1 week at ,09:00 on Monday, Wednesday, Friday)   pantoprazole 40 mg oral delayed release tablet: 1 tab(s) orally once a day -.ADOD 9/23 - .Meds to Beds - .Patient Location [address]  Alcohol Pads (100 each): Use topically prior to testing or injecting Insulin.  [id number] DAMIAN 4027 ADOD 9/23  pharmacy may subsitute based on insurance  one touch delica lancets : use to check glucose 4 times daily  [id number]  nazario [facility] ADOD 9/23  pharmacy may substitute based on insurance  please use coupon  one touch verio meter: use to check glucose 4 times daily  [id number]  nazario [facility] ADOD 9/23  pharmacy may  substitute based on insurance  please use coupon  one touch verio test strips: use to check glucose 4 times daily  [place] [address] ADOD 9/23  pharmacy may substitute based on insurance  please use coupon  Pen Needles Latanya 32 G 4 mm (100 each): Use to inject Insulin under the skin 3 times per day  [place] [address] ADOD 9/23  pharmacy may substitute based on insurance  HumaLOG KwikPen 100 units/mL injectable solution: inject insulin via scale up to 3 times daily with meals based on scale     = 0u  151-200 = 2u  201-250 = 4u  251-300 = 6u  301-350 = 8u  351-400 + = 10u    [place] [address] ADOD 9/23  [email or web address]  pharmacy may substitute based on insurance coverage    glipiZIDE 5 mg oral tablet: 1 tab(s) orally twice daily with breakfast and dinner      [place] [address] ADOD 9/23  [address]  dexAMETHasone 2 mg oral tablet: 2 tab(s) orally 2 times a day  - .Meds to Beds - take 4mg (2 tabs) orally twice a day for 5 days to 9/28. Then 9/29 switch to 2mg (1 tab) twice a day.     ADOD Patient Location 9/23 [place] [address]  Multiple Vitamins oral tablet: 1 tab(s) orally once a day.    Objective:     Objective Information:      T   P  R  BP   MAP  SpO2   Value  35.6  69  18  153/90      98%  Date/Time 9/24 14:43 9/24 18:30 9/24 18:30 9/24 18:30    9/24 18:30  Range  (35.6C - 35.6C )  (63 - 70 )  (16 - 18 )  (130 - 153 )/ (71 - 90 )    (96% - 98% )    Physical Exam Narrative:  ·  Physical Exam:    Constitutional:  Sitting in bed in NAD, awake and alert  CV: RRR, no murmurs, rubs, or gallops  Pulm: CTAB, no increased WOB  GI: abd soft, NT, ND  Ext: bilateral LE without pitting edema,  Neuro: alert and conversant, 0/5 strength RUE, 5/5 LUE, 4/5 RLE , 5/5 LLE. Sensation intact bilaterally, cranial nerves intact.   Psych: affect appropriate    Recent Lab Results:    Results:    CBC: 9/24/2023 17:00              \     Hgb     /                              \     15.5       /  WBC  ----------------  Plt               14.0 H     ----------------    311              /     Hct     \                              /     44.6       \            RBC: 4.99     MCV: 89     Neutrophil %: 85.8      RFP: 9/24/2023 17:00  NA+        Cl-     BUN  /                         141    97 L   16  /  --------------------------------  Glucose                ---------------------------  245 H    K+     HCO3-   Creat \                         4.5    37 H   0.74  \  Calcium : 9.7Anion Gap : 12          Albumin : 3.8     Phos : 2.9      Coagulation: 9/21/2023 07:12  PT  /                    11.6  /  -------<    INR          ----------<      1.0  PTT\                    26 L \                         I have reviewed these laboratory results:    Venous Full Panel  24-Sep-2023 17:05:00      Result Value    pH, Venous  7.46   H   pCO2, Venous  55   H   pO2, Venous  33   L   SO2, Venous  46    Bicarbonate, Calculated, Venous  39.1   H   HGB, Venous  15.4    Anion Gap Level, Venous  4   L   Base Excess, Venous  12.6   H   Calcium, Ionized Venous  1.20    Chloride Level  98    Glucose Level, Venous  257   H   HCT CALCULATED, Venous  46.0    Lactate Level, Venous  1.4    OXY HGB, Venous  44.6   L   Patient Temperature, Venous  37.0    Potassium Level, Venous  3.8    Sodium Level, Venous  137        Assessment and Plan:   Assessment:    63F PMHx HTN, DM2, HLD, tobacco use who was recently admitted for workup of suspected new lung malignancy (prelim biopsy result NSCLC)  and 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 S/p LP during prior admission to assess for leptomeningeal disease. Discharged on dexamethasone 4q8, will continue;  likely needs PT/OT assessment and either discharge with confirmed homecare or SNF placement. Will c/w prior inulin regimen for steroid induced hyperglycemia.       #Brain metastatic lesions  #Likely RUL NSCLC  :: 9/12 OSH CT head w/o contrast stable  intracranial numerous hypodense lesions throughout brain with surrounding edema suspicious for hyperdense/hemorrhagic metastasis  ::9/12 OSH CTAP  2.8 x 2.7 x 3.3 cm cavitary mass with geographic rim of ground-glass attenuation in right upper lobe suspicious for primary lung neoplasm. Several mildly prominent subcentimeter R hilar lymph nodes.  ::9/13 OSH MRI  numerous peripherally enhancing, intra-axial masses in bilateral cerebral hemispheres and cerebellar hemispheres consistent with metastatic disease largest measuring up to 2cm in size in R occipital and L parietal lobes.  :: 0/5 RUE, 5/5 LUE, 4/5 RLE, 5/5 LLE strength; sensation and cranial nerves intact  :: 9/17 MRI spine showed heterogeneous enhancing cord mass at T1-2 appears  intramedullary and is highly suspicious for a spinal cord metastasis and probable osseous metastases, most prominently at T8  :: s/p bronchoscopic lung biopsy, path suggestive of adenocarcinoma of lung, although cell block contained very little tumor and per report is inadequate for molecular studies. PDL1 stain pending.   :: LP 9/21 significant for protein 49 (H), Glucose 115 (H), RBC 10 (); cultures NGTD/in progress, IgG levels canceled; cytology with few lymphocytes and monocytes but no malignant cells seen  -  Consult rad onc to continue inpatient radiation   -  PT/OT ordered for falls, pt may require SNF  -  Resumed dexamethasone 4q8, plan to taper to BID on 9/28        -     Tylenol 975mg q8  -  If any worsening neuro deficits consider CT head for evaluation of expanding hemorrhagic lesions  -  Hold off any anticoag/dvt prophylaxis at this time  -  Monitor for seizures    #HTN  :: On home Lisinopril 10mg daily, HCTZ 12.5mg daily, and Lasix 40mg daily  - Continue Lisinopril 10mg and HCTZ 12.5mg   - hold Lasix PO 40mg (bicarb elevation, likely contraction alkalois); trend bicarb and resume if downtrending      #T2DM  :: On home metformin-sitagliptin  :: Hb A1c 6.3%  - c/w NPH 20u  BID  - Lispro moderate scale q6 with dex doses   - Diabetic diet  - Monitor glucose levels        #HLD  :: On home atorvastatin 40mg daily  - Continue atorvastatin        #Tobacco use  - Nicotine patch    #Constipation  - Miralax 34mg BID  - Senna BID    F: PRN  E: PRN  N: Diabetic diet  A: PIV  GI: Protonix 40 daily  DVT: SCDS  NOK:  ольга   Code status Full code    Nathaly Dejesus   Internal Medicine PGY-3  TEACHING ATTENDING ATTESTATION    I saw this patient with the Resident, total face to face time was 60 minutes  History and physical examination were reviewed in the presence of the patient and family. More than half of this time was spent on coordination of care and patient education. I personally obtained the key and critical portions of the H&P.  The pertinent aspects of the history were: As above.  The pertinent aspects of the examination were: As above.  I discussed diagnosis and treatment plan with the resident and the diagnosis and plan as documented reflects accurately my opinion.  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 Readmitted due to a fall and confusion and 5 min episode of twitching of right arm. Head CT negative for bleed. Today on rounds A&Ox3. Continue steroids, consult  neurology, may benefit from antiseizure meds if EEG demonstrates seizure activity. To continue palliative XRT to brain and spine as inpatient. Will likely need SNF            Electronic Signatures:  Nathaly Dejesus (Resident))  (Signed 25-Sep-2023 06:59)   Authored: History of Present Illness, Comorbidities,  Allergies, Medications Prior to Admission, Objective, Assessment and Plan  Matthew Gomes)  (Signed 25-Sep-2023 11:50)   Authored: Assessment and Plan, Note Completion   Co-Signer: Assessment and Plan      Last Updated: 25-Sep-2023 11:50 by Matthew Gomes)

## 2023-09-30 NOTE — PROGRESS NOTES
Service: Hematology - Oncology     Subjective Data:   NADER MAHMOOD is a 63 year old Female who is Hospital Day # 3.     No acute events overnight.  Patient seen and evaluated this morning, no acute distress and.  She appears more confused than yesterday, mumbling words.  Is able to tell me her name and that she is at  [facility].  Unable to tell me the date and year.  Reports that she slept well, chest pain/shortness of breath, nausea/vomiting, abdominal pain.  Plan for radiation today.    Family member initially that she was restless when going down to radiation this morning, Atarax helped.    Objective Data:     Objective Information:      T   P  R  BP   MAP  SpO2   Value  35.9  57  18  135/76   101  100%  Date/Time 9/26 4:55 9/26 4:55 9/26 4:55 9/26 4:55  9/25 14:11 9/26 4:55  Range  (35.9C - 37C )  (56 - 67 )  (18 - 18 )  (122 - 171 )/ (73 - 96 )  (101 - 101 )  (93% - 100% )  Highest temp of 37 C was recorded at 9/25 17:50      Pain reported at 9/25 8:41: 0  Pain reported at 9/25 8:41: 0 = None    Physical Exam Narrative:  ·  Physical Exam:    Constitutional:  Sitting in bed in NAD, awake, mumbling words on exam today.   CV: RRR, no murmurs, rubs, or gallops  Pulm: CTAB, no increased WOB  GI: abd soft, NT, ND  Ext: bilateral LE without pitting edema  Neuro: 0/5 strength RUE, 5/5 LUE, 4/5 RLE , 5/5 LLE. Sensation intact bilaterally, cranial nerves intact.   Psych: affect appropriate, ANO x2.   Can tell me that she is at [facility] at her full name, but cannot tell me the month and year    Recent Lab Results:    Results:    CBC: 9/24/2023 17:00              \     Hgb     /                              \     15.5       /  WBC  ----------------  Plt               14.0 H    ----------------    311              /     Hct     \                              /     44.6       \            RBC: 4.99     MCV: 89     Neutrophil %: 85.8      RFP: 9/24/2023 17:00  NA+        Cl-     BUN  /                          141    97 L   16  /  --------------------------------  Glucose                ---------------------------  245 H    K+     HCO3-   Creat \                         4.5    37 H   0.74  \  Calcium : 9.7Anion Gap : 12          Albumin : 3.8     Phos : 2.9      Assessment and Plan:   Code Status:  ·  Code Status Full Code     Assessment:    63F PMHx HTN, DM2, HLD, tobacco use who was recently admitted for workup of suspected new lung malignancy (prelim biopsy result NSCLC)  and 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 S/p LP during prior admission to assess for leptomeningeal disease. Discharged on dexamethasone 4q8, will continue;  likely needs PT/OT assessment and either discharge with confirmed homecare or SNF placement.     Updates 9/26:  - Video continuous EEG overnight, preliminary report showing moderate diffuse encephalopathy, final report pending  - Radiation this a.m.  - Dexamethasone changed to 6 mg twice daily  - Urinalysis today  - Seroquel 12.5 as needed for agitation  - Nicotine patch to increase to 21 mg  - PT to evaluate patient, pending recommendations will pursue SNF options with family    Updates 9/25:  - Endocrinology consulted for management of blood glucose in setting of steroid taper, appreciate recommendations  - Radiation oncology consulted for continuation of radiation to spine and brain, appreciate recommendations  - Patient scheduled for radiation at 1330  - Neurology consulted for potential seizures in setting of brain metastases, appreciate recommendations  - Video continuous EEG ordered    #Brain metastatic lesions  #Likely RUL NSCLC  #Concern for seizures  :: 9/12 OSH CT head w/o contrast stable intracranial numerous hypodense lesions throughout brain with surrounding edema suspicious for hyperdense/hemorrhagic metastasis  ::9/12 OSH CTAP  2.8 x 2.7 x 3.3 cm  cavitary mass with geographic rim of ground-glass attenuation in right upper lobe suspicious for primary lung neoplasm. Several mildly prominent subcentimeter R hilar lymph nodes.  ::9/13 OSH MRI  numerous peripherally enhancing, intra-axial masses in bilateral cerebral hemispheres and cerebellar hemispheres consistent with metastatic disease largest measuring up to 2cm in size in R occipital and L parietal lobes.  :: 0/5 RUE, 5/5 LUE, 4/5 RLE, 5/5 LLE strength; sensation and cranial nerves intact  :: 9/17 MRI spine showed heterogeneous enhancing cord mass at T1-2 appears  intramedullary and is highly suspicious for a spinal cord metastasis and probable osseous metastases, most prominently at T8  :: s/p bronchoscopic lung biopsy, path suggestive of adenocarcinoma of lung, although cell block contained very little tumor and per report is inadequate for molecular studies. PDL1 stain pending.   :: LP 9/21 significant for protein 49 (H), Glucose 115 (H), RBC 10 (); cultures NGTD/in progress, IgG levels canceled; cytology with few lymphocytes and monocytes but no malignant cells seen  -  Radiation oncology consulted for inpatient radiation  -  PT/OT ordered for falls, pt may require SNF  -  Resumed dexamethasone 4q8, plan to taper to BID on 9/28        -     Tylenol 975mg q8  -  If any worsening neuro deficits consider CT head for evaluation of expanding hemorrhagic lesions  -  Hold off any anticoag/dvt prophylaxis at this time  -  Continuous video EEG ordered, neurology consulted    #HTN  :: On home Lisinopril 10mg daily, HCTZ 12.5mg daily, and Lasix 40mg daily  - Continue Lisinopril 10mg and HCTZ 12.5mg   - hold Lasix PO 40mg (bicarb elevation, likely contraction alkalosis trend bicarb and resume if downtrending    #T2DM  :: On home metformin-sitagliptin  :: Hb A1c 6.3%  - c/w NPH 20u BID  - Diabetic diet  - Monitor glucose levels  - Endocrinology consulted for management of blood sugars in setting of dexamethasone  therapy, appreciate recommendations    #HLD  :: On home atorvastatin 40mg daily  - Continue atorvastatin    #Tobacco use  - Nicotine patch    #Constipation  - Miralax 34mg BID  - Senna BID    F: PRN  E: PRN  N: Diabetic diet  A: PIV  GI: Protonix 40 daily  DVT: SCDS  NOK:  ольга   Code status Full code    Melchor Abraham MD  PGY1 Neurology    Case discussed with on-call attending.      Attestation:   Note Completion:  I am a:  Resident/Fellow   Attending Attestation I saw and evaluated the patient.  I personally obtained the key and critical portions of the history and physical exam or was physically present for key and  critical portions performed by the resident/fellow. I reviewed the resident/fellow?s documentation and discussed the patient with the resident/fellow.  I agree with the resident/fellow?s medical decision making as documented in their note  with the exception/addition of the following:    I personally evaluated the patient on 26-Sep-2023   Comments/ Additional Findings    TEACHING ATTENDING ATTESTATION    I saw this patient with the Resident, total face to face time was 60 minutes  History and physical examination were reviewed in the presence of the patient. More than half of this time was spent on coordination of care and patient education. I personally obtained the key and critical portions of the H&P.  The pertinent aspects of the history were: As above.  The pertinent aspects of the examination were: As above.  I discussed diagnosis and treatment plan with the resident and the diagnosis and plan as documented reflects accurately my opinion.  I wish to emphasize: patient with delirium still. consider decreasing steroids. continue XRT to brain/spine. h/o metastatic NSCLC          Electronic Signatures:  Matthew Gomes)  (Signed 26-Sep-2023 13:48)   Authored: Note Completion   Co-Signer: Service, Subjective Data, Objective Data, Assessment and Plan, Note  Completion  Melchor Abraham (Resident))  (Signed 26-Sep-2023 11:33)   Authored: Service, Subjective Data, Objective Data, Assessment  and Plan, Note Completion      Last Updated: 26-Sep-2023 13:48 by Matthew Gomes)

## 2023-09-30 NOTE — PROGRESS NOTES
Service: Endocrinology     Subjective Data:   NADER MAHMOOD is a 63 year old Female who is Hospital Day # 3.    Additional Information:    pt seen and examined.  at bedside informed of plan to increase premeal lispro and strengthen ssi     ROS- unable to complete 2/2 AMS      Objective Data:     Objective Information:      T   P  R  BP   MAP  SpO2   Value  35.9  57  18  135/76   101  100%  Date/Time  4:55  4:55  4:55  4:55   14:11  4:55  Range  (35.9C - 37C )  (56 - 67 )  (18 - 18 )  (122 - 171 )/ (73 - 96 )  (101 - 101 )  (93% - 100% )  Highest temp of 37 C was recorded at  17:50      Pain reported at  8:41: 0  Pain reported at  8:41: 0 = None    Physical Exam Narrative:  ·  Physical Exam:    Constitutional: Well developed, awake/alert/oriented x1, no distress, alert and cooperative  Eyes: EOMI, clear sclera  ENMT: mucous membranes moist, no apparent injury, no lesions seen  Head/Neck: Neck supple, no apparent injury  Respiratory/Thorax: good chest expansion, thorax symmetric  Cardiovascular: 2+ equal pulses of the extremities  Gastrointestinal: Nondistended, soft, non-tender, no rebound tenderness or guarding  Musculoskeletal: ROM intact, RUE and RLE weakness  Extremities: normal extremities, no edema  Neurological: alert and oriented x3, CN's grossly intact, normal strength  Psychological: Appropriate mood and behavior  Skin: Warm and dry, no lesions, no rashes      Medication:    Medications:      ANTI-INFECTIVES:    1. Sulfamethoxazole 800 mg - Trimethoprim 160 m  tablet(s)  Oral  <User Schedule>      CARDIOVASCULAR AGENTS:    1. Lisinopril:  10  mg  Oral  Daily    2. Furosemide:  40  mg  Oral  Daily    3. hydroCHLOROthiazide:  12.5  mg  Oral  Daily      CENTRAL NERVOUS SYSTEM AGENTS:    1. Acetaminophen:  650  mg  Oral  Every 4 Hours   PRN       2. levETIRAcetam (KEPPRA):  750  mg  Oral  2 Times a Day      COAGULATION MODIFIERS:    1. Enoxaparin  SubCutaneous:  40  mg  SubCutaneous  Every 24 Hours      GASTROINTESTINAL AGENTS:    1. Polyethylene Glycol:  34  gram(s)  Oral  2 Times a Day    2. Sennosides:  1  tablet(s)  Oral  2 Times a Day   PRN       3. Pantoprazole:  40  mg  Oral  Daily      HORMONES/HORMONE MODIFIERS:    1. dexAMETHasone:  6  mg  Oral  Every 12 Hours      METABOLIC AGENTS:    1. Insulin Isophane (NPH) Injectable:  15  unit(s)  SubCutaneous  <User Schedule>    2. Insulin Lispro (HumaLOG) Injectable:  10  unit(s)  SubCutaneous  3 Times a Day Before Meals    3. Insulin Lispro Customizable Corrective Scale:  unit(s)  SubCutaneous  4 Times a Day Insulin Timing    4. Atorvastatin:  40  mg  Oral  Every Night    5. Dextrose 50% in Water Injectable:  25  gram(s)  IntraVenous Push  Every 15 Minutes   PRN       6. Glucagon Injectable:  1  mg  IntraMuscular  Every 15 Minutes   PRN         MISCELLANEOUS AGENTS:    1. Nicotine 21 mg/ 24 hour TransDermal:  1  patch  TransDermal  Every 24 Hours      NUTRITIONAL PRODUCTS:    1. Sodium Chloride 0.9% Injectable Flush:  10  mL  IntraVenous Flush  Every 8 Hours and as Needed   PRN         PSYCHOTHERAPEUTIC AGENTS:    1. QUEtiapine:  12.5  mg  Oral  3 Times a Day   PRN           Recent Lab Results:    Results:        I have reviewed these laboratory results:    Glucose_POCT  Trending View      Result 26-Sep-2023 14:56:00  26-Sep-2023 10:47:00  26-Sep-2023 00:20:00  25-Sep-2023 22:04:00  25-Sep-2023 18:31:00  25-Sep-2023 14:38:00  25-Sep-2023 10:43:00  25-Sep-2023 05:34:00  25-Sep-2023 01:26:00    Glucose-POCT 143   H   206   H   184   H   237   H   247   H   201   H   215   H   281   H   271   H        Renal Function Panel  26-Sep-2023 06:07:00      Result Value    Glucose, Serum  174   H   NA  139    K  4.4    CL  99    Bicarbonate, Serum  30    Anion Gap, Serum  14    BUN  18    CREAT  0.63    GFR Female  >90    Calcium, Serum  9.7    Phosphorus, Serum  3.5    ALB  3.6        Assessment and Plan:   Daily Risk  Screen:  ·  Does patient have an indwelling urinary catheter? n/a consulting service   ·  Does patient have a central line? 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     Hospital course of previous admission as below:     Cristiane Harvey is a 63 year old female with a hx of htn, dm2, hld, tobacco use who presents as a transfer from [facility] for likely lung cancer with brain mets.  Hemorrhagic lesions as per report that were stable on multiple scans. Presented with 0/5 RUE, 5/5 LUE, 4/5 RLE, and 5/5 LLE strength with intact sensation and cranial nerves.    Endocrinology consulted for DM2 with steroid induced hyperglycemia on dexamethasone 4mg q8hr  -a1c 6.3% (9/23)  -seen by pcp for dm care  -current home regimen: janumet  XR once daily  -glc at home generally in 90s-120s    ASSESSMENT- T2DM with steroid induced hyperglycemia  9/19- dexamethasone 4mg q6h  9/23- dexamethasone spaced to 4mg q8h x 5d (approx at 200, 1000 and 1800)    PLAN-  - continue NPH insulin as 15u TID at 07:00, 15:00, and 23:00 with dexamethasone 4mg dose            NPO with dexamethasone continued: continue NPH order            NPO with dexamethasone hold: reduce to NPH 10u q12    -adjust lispro ssi to custom 3u:50>150mg/dL QID insulin timing (before meals and HS) - ok to give at approximate meal times if eating poorly         NPO: adjust to TID at same time as NPH dosing     - increase lispro 10u ACTID        NPO or glucose <90mg/dL within 1hr before meal: hold    - Goal BG <180  -Accuchecks ACHS (or 07:00, 15:00, and 23:00 if NPO)  -Hypoglycemia protocol  -Diabetes Diet- low carb 75 CHO  -Will  "continue to follow and titrate insulin accordingly    -Please contact/DocHalo if/when pt?s diet changes or if steroid regimen changes, and 1-2 days prior to anticipated discharge for optimal planning and transition to home regimen        DISCHARGE PLANNING:     pt with increased difficulty in seeing, unable to move right arm at all. pt is unable to give herself an insulin injection, attempted with practice pen. however, pt's mother is able to give her  insulin injection, practiced pen injection with pt's mother as well.   Initially discussed NPH/lispro insulin regimen while on dex, however pt was unable to follow the regimen d/t confusion. she lives with  but would need someone to give her multiple injections per day. with these barriers, discussed instead resuming  janumet, starting glipizide BID, and PRN humalog sliding scale. pt and family agreeable to plan.    discharge regimen: resume janumet  XR once daily, start glipizide 5mg BID with breakfast and dinner, lispro scale 2u:50 > 150  PRN with meals - may be adjusted this admission according to d/c steroid plans      ordered by endo team from [id number] at most recent d/c on 9/23/23:   - glipizide 5mg BID with breakfast and dinner  - insulin lispro U-100 insulin pen \"inject sliding scale three times daily with meals  = 0u, 151-200 = 2u, 201-250 = 4u, 251-300 = 6u, 301-350 = 8u, 351-400+ = 10u, use up to 25u daily\" 30 days = 5 pens   - insulin pen needles 32G 4mm for QID injections, 90 days = 400 pen needles  - glucose testing supplies for QID glucose measurement, 90 days = 1 glucose meter, 400 lancets and 400 strips  - alcohol swabs      Will follow up in post-hospital discharge clinic with Vianney Webber PA-C on 10/24 at 9:30 VIRTUAL      Patient seen and examined.   Plan communicated to primary team via doc halo to Dr. Melchor Mchugh PA-C  Pager [phone] or Doc Halo  Please contact endocrinology fellow on-call after 5p - " page [phone]              Attestation:   Note Completion:  Provider/Team Pager # [phone]         Electronic Signatures:  Payal Mchugh (PAC)  (Signed 26-Sep-2023 17:39)   Authored: Service, Subjective Data, Objective Data, Assessment  and Plan, Note Completion      Last Updated: 26-Sep-2023 17:39 by Payal Mchugh (PAC)

## 2023-09-30 NOTE — PROGRESS NOTES
Service: Endocrinology     Subjective Data:   NADER MAHMOOD is a 63 year old Female who is Hospital Day # 4.    Additional Information:    pt seen and examined.  and multiple family members at bedside informed of plan to decrease all insulin doses to address decrease in steroid dose    per primary team, pt is likely to transition to hospice care    ROS- unable to complete 2/2 AMS      Objective Data:     Objective Information:      T   P  R  BP   MAP  SpO2   Value  36.6  95  18  130/91      94%  Date/Time  10:59  10:59  10:59  10:59     10:59  Range  (35.9C - 36.9C )  (57 - 96 )  (16 - 18 )  (130 - 164 )/ (76 - 98 )    (92% - 100% )  Highest temp of 36.9 C was recorded at  1:17      Pain reported at  8:09: sleeping    Physical Exam Narrative:  ·  Physical Exam:    Constitutional: Well developed, awake/alert/oriented x0, no distress,  Eyes: EOMI, clear sclera  ENMT: mucous membranes moist, no apparent injury, no lesions seen  Head/Neck: Neck supple, no apparent injury  Respiratory/Thorax: good chest expansion, thorax symmetric  Cardiovascular: 2+ equal pulses of the extremities  Gastrointestinal: Nondistended, soft, non-tender, no rebound tenderness or guarding  Musculoskeletal: ROM intact, RUE and RLE weakness  Extremities: normal extremities, no edema  Neurological: unable to assess   Psychological: AMS  Skin: Warm and dry, no lesions, no rashes      Medication:    Medications:      ANTI-INFECTIVES:    1. Sulfamethoxazole 800 mg - Trimethoprim 160 m  tablet(s)  Oral  <User Schedule>      CARDIOVASCULAR AGENTS:    1. Lisinopril:  10  mg  Oral  Daily    2. Furosemide:  40  mg  Oral  Daily    3. hydroCHLOROthiazide:  12.5  mg  Oral  Daily      CENTRAL NERVOUS SYSTEM AGENTS:    1. Acetaminophen:  650  mg  Oral  Every 4 Hours   PRN       2. levETIRAcetam (KEPPRA) IV Piggy Back:  750  mg  IntraVenous Piggyback  Every 12 Hours      GASTROINTESTINAL AGENTS:    1.  Polyethylene Glycol:  34  gram(s)  Oral  2 Times a Day    2. Sennosides:  1  tablet(s)  Oral  2 Times a Day   PRN       3. Pantoprazole:  40  mg  Oral  Daily      HORMONES/HORMONE MODIFIERS:    1. dexAMETHasone:  6  mg  Oral  Every 12 Hours      METABOLIC AGENTS:    1. Insulin Isophane (NPH) Injectable:  15  unit(s)  SubCutaneous  Every 12 Hours    2. Insulin Lispro (HumaLOG) Injectable:  6  unit(s)  SubCutaneous  3 Times a Day Before Meals    3. Insulin Lispro Customizable Corrective Scale:  unit(s)  SubCutaneous  Every 6 Hours    4. Atorvastatin:  40  mg  Oral  Every Night    5. Dextrose 50% in Water Injectable:  25  gram(s)  IntraVenous Push  Every 15 Minutes   PRN       6. Glucagon Injectable:  1  mg  IntraMuscular  Every 15 Minutes   PRN         MISCELLANEOUS AGENTS:    1. Nicotine 21 mg/ 24 hour TransDermal:  1  patch  TransDermal  Every 24 Hours      NUTRITIONAL PRODUCTS:    1. Sodium Chloride 0.9% Injectable Flush:  10  mL  IntraVenous Flush  Every 8 Hours and as Needed   PRN         PSYCHOTHERAPEUTIC AGENTS:    1. OLANZapine IntraMuscular:  10  mg  IntraMuscular  Once   PRN       2. QUEtiapine:  12.5  mg  Oral  3 Times a Day   PRN             Currently Suspended Medications       --------------------------------    1. Enoxaparin SubCutaneous:  40  mg  SubCutaneous  Every 24 Hours        Recent Lab Results:    Results:        I have reviewed these laboratory results:    Glucose_POCT  Trending View      Result 27-Sep-2023 16:24:00  27-Sep-2023 11:55:00  27-Sep-2023 08:57:00  27-Sep-2023 08:13:00  27-Sep-2023 05:24:00  27-Sep-2023 01:21:00  26-Sep-2023 23:44:00  26-Sep-2023 21:40:00  26-Sep-2023 18:51:00    Glucose-POCT 211   H   261   H   221   H   173   H   171   H   107   H   104   H   202   H   150   H        Renal Function Panel  27-Sep-2023 07:00:00      Result Value    Glucose, Serum  183   H   NA  141    K  4.5    CL  99    Bicarbonate, Serum  30    Anion Gap, Serum  17    BUN  17    CREAT  0.58    GFR  Female  >90    Calcium, Serum  9.5    Phosphorus, Serum  3.8    ALB  3.7        Assessment and Plan:   Daily Risk Screen:  ·  Does patient have an indwelling urinary catheter? n/a consulting service   ·  Does patient have a central line? n/a consulting service     Code Status:  ·  Code Status DNAR with Added Limitations     Assessment:    CRISTIANE HARVEY is a 63 year old Female PMHx suspected new lung cancer with brain  metastasis, HTN, DM2, HLD, tobacco use who was discharged 9/23 after admission for workup of hemorrhagic brain mets s/p initiation of radiation 9/22, presenting with persistent weakness and an inability to care for herself.     Hospital course of previous admission as below:     Cristiane Harvey is a 63 year old female with a hx of htn, dm2, hld, tobacco use who presents as a transfer from [facility] for likely lung cancer with brain mets.  Hemorrhagic lesions as per report that were stable on multiple scans. Presented with 0/5 RUE, 5/5 LUE, 4/5 RLE, and 5/5 LLE strength with intact sensation and cranial nerves.    Endocrinology consulted for DM2 with steroid induced hyperglycemia on dexamethasone 4mg q8hr  -a1c 6.3% (9/23)  -seen by pcp for dm care  -current home regimen: janumet  XR once daily  -glc at home generally in 90s-120s    ASSESSMENT- T2DM with steroid induced hyperglycemia  9/19- dexamethasone 4mg q6h  9/23- dexamethasone spaced to 4mg q8h x 5d   9/27- dexamethasone spaced to 4mg q12h x 5d    PLAN-  - adjust NPH insulin to 15u BID with dexamethasone 4mg dose            NPO with dexamethasone continued: continue NPH order            NPO with dexamethasone hold: reduce to NPH 10u q12    - adjust timing of ssi to lispro ssi custom 3u:50>150mg/dL q6hr        NPO: continue above    - decrease lispro 6u ACTID        NPO or glucose <90mg/dL within 1hr before meal: hold    - Goal BG <180  -Accuchecks q6hr    -Hypoglycemia protocol  -Diabetes Diet- low carb 75 CHO  -Will continue to follow and  "titrate insulin accordingly    -Please contact/DocHalo if/when pt?s diet changes or if steroid regimen changes, and 1-2 days prior to anticipated discharge for optimal planning and transition to home regimen        DISCHARGE PLANNING:     pt with increased difficulty in seeing, unable to move right arm at all. pt is unable to give herself an insulin injection, attempted with practice pen. however, pt's mother is able to give her  insulin injection, practiced pen injection with pt's mother as well.   Initially discussed NPH/lispro insulin regimen while on dex, however pt was unable to follow the regimen d/t confusion. she lives with  but would need someone to give her multiple injections per day. with these barriers, discussed instead resuming  janumet, starting glipizide BID, and PRN humalog sliding scale. pt and family agreeable to plan.    discharge regimen: resume janumet  XR once daily, start glipizide 5mg BID with breakfast and dinner, lispro scale 2u:50 > 150 PRN with  meals - may be adjusted this admission according to d/c steroid plans      ordered by endo team from [id number] at most recent d/c on 9/23/23:   - glipizide 5mg BID with breakfast and dinner  - insulin lispro U-100 insulin pen \"inject sliding scale three times daily with meals  = 0u, 151-200 = 2u, 201-250 = 4u, 251-300 = 6u, 301-350 = 8u, 351-400+ = 10u, use up to 25u daily\" 30 days = 5 pens   - insulin pen needles 32G 4mm for QID injections, 90 days = 400 pen needles  - glucose testing supplies for QID glucose measurement, 90 days = 1 glucose meter, 400 lancets and 400 strips  - alcohol swabs      Will follow up in post-hospital discharge clinic with Vianney Webber PA-C on 10/24 at 9:30 VIRTUAL, if not transitioned to hospice.     Endocrine will conclude consult if pt is transitioned to hospice       Patient seen and examined.   Plan communicated to primary team via doc halo to Dr. Melchor Mchugh, " [place]  Pager [phone] or Doc Halo  Please contact endocrinology fellow on-call after 5p - page [phone]              Attestation:   Note Completion:  Provider/Team Pager # [phone]         Electronic Signatures:  Payal Mchugh (PAC)  (Signed 27-Sep-2023 20:00)   Authored: Service, Subjective Data, Objective Data, Assessment  and Plan, Note Completion      Last Updated: 27-Sep-2023 20:00 by Payal Mchugh (PAC)

## 2023-09-30 NOTE — PROGRESS NOTES
Service: Hematology - Oncology     Subjective Data:   NADER MAHMOOD is a 63 year old Female who is Hospital Day # 5.     Patient transitioning to hospice today. Insulin orders held overnight since patient is not eating.    Objective Data:     Objective Information:      T   P  R  BP   MAP  SpO2   Value  36.6  89  18  161/86      99%  Date/Time 9/28 3:00 9/28 3:00 9/28 3:00 9/28 3:00    9/28 3:00  Range  (36.3C - 36.9C )  (74 - 95 )  (16 - 18 )  (130 - 161 )/ (81 - 98 )    (92% - 100% )  Highest temp of 36.9 C was recorded at 9/27 1:17      Pain reported at 9/28 3:12: sleeping    Physical Exam Narrative:  ·  Physical Exam:    Constitutional:  NAD, sleeping, unresponsive  CV: RRR, no murmurs, rubs, or gallops  Pulm: CTAB, no increased WOB  GI: abd soft, NT, ND  Ext: bilateral LE without pitting edema  Neuro: Unable to assess  Psych: ANOx0.    Recent Lab Results:    Results:    CBC: 9/27/2023 07:00              \     Hgb     /                              \     17.3 H    /  WBC  ----------------  Plt               19.4 H    ----------------    257              /     Hct     \                              /     49.2 H    \            RBC: 5.42 H    MCV: 91           RFP: 9/27/2023 07:00  NA+        Cl-     BUN  /                         141    99    17  /  --------------------------------  Glucose                ---------------------------  183 H    K+     HCO3-   Creat \                         4.5    30    0.58  \  Calcium : 9.5Anion Gap : 17          Albumin : 3.7     Phos : 3.8      Assessment and Plan:   Code Status:  ·  Code Status DNAR with Added Limitations     Assessment:    63F PMHx HTN, DM2, HLD, tobacco use who was recently admitted for workup of suspected new lung malignancy (prelim biopsy result NSCLC) and hemorrhagic brain met s/p initiation  of radiation therapy 9/22, discharged 9/23, now readmitted with persistent weakness of RUE and RLE. Worsening metastatic brain disease. No  neurosurgical intervention indicated. Appropriate for hospice  care at this time.    Updates 9/28:  - Transitioning to hospice care today    Updates 9/27:  - Rapid response this AM  - Repeat CT head shows worsening of hemorrhagic metastatic brain disease  - Neurosurgery consulted for recommendations on intervention - no indication for surgery  - Hospice consulted    Updates 9/26:  - Video continuous EEG overnight, preliminary report showing moderate diffuse encephalopathy, final report pending  - Radiation this a.m.  - Dexamethasone changed to 6 mg twice daily  - Urinalysis today  - Seroquel 12.5 as needed for agitation  - Nicotine patch to increase to 21 mg  - PT to evaluate patient, pending recommendations will pursue SNF options with family    Updates 9/25:  - Endocrinology consulted for management of blood glucose in setting of steroid taper, appreciate recommendations  - Radiation oncology consulted for continuation of radiation to spine and brain, appreciate recommendations  - Patient scheduled for radiation at 1330  - Neurology consulted for potential seizures in setting of brain metastases, appreciate recommendations  - Video continuous EEG ordered    #Brain metastatic lesions  #Likely RUL NSCLC  #Concern for seizures  :: 9/12 OSH CT head w/o contrast stable intracranial numerous hypodense lesions throughout brain with surrounding edema suspicious for hyperdense/hemorrhagic metastasis  ::9/12 OSH CTAP  2.8 x 2.7 x 3.3 cm cavitary mass with geographic rim of ground-glass attenuation in right upper lobe suspicious for primary lung neoplasm. Several mildly prominent subcentimeter R hilar lymph nodes.  ::9/13 OSH MRI  numerous peripherally enhancing, intra-axial masses in bilateral cerebral hemispheres and cerebellar hemispheres consistent with metastatic disease largest measuring up to 2cm in size in R occipital and L parietal lobes.  :: 0/5 RUE, 5/5 LUE, 4/5 RLE, 5/5 LLE strength; sensation and cranial nerves  intact  :: 9/17 MRI spine showed heterogeneous enhancing cord mass at T1-2 appears  intramedullary and is highly suspicious for a spinal cord metastasis and probable osseous metastases, most prominently at T8  :: s/p bronchoscopic lung biopsy, path suggestive of adenocarcinoma of lung, although cell block contained very little tumor and per report is inadequate for molecular studies. PDL1 stain pending.   :: LP 9/21 significant for protein 49 (H), Glucose 115 (H), RBC 10 (); cultures NGTD/in progress, IgG levels canceled; cytology with few lymphocytes and monocytes but no malignant cells seen  -  Radiation oncology consulted for inpatient radiation  -  PT/OT ordered for falls, pt may require SNF  -  Resumed dexamethasone 4q8, plan to taper to BID on 9/28        -     Tylenol 975mg q8  -  If any worsening neuro deficits consider CT head for evaluation of expanding hemorrhagic lesions  -  Hold off any anticoag/dvt prophylaxis at this time  -  Continuous video EEG ordered, neurology consulted    #HTN  :: On home Lisinopril 10mg daily, HCTZ 12.5mg daily, and Lasix 40mg daily  - Continue Lisinopril 10mg and HCTZ 12.5mg   - hold Lasix PO 40mg (bicarb elevation, likely contraction alkalosis trend bicarb and resume if downtrending    #T2DM  :: On home metformin-sitagliptin  :: Hb A1c 6.3%  - c/w NPH 20u BID  - Diabetic diet  - Monitor glucose levels  - Endocrinology consulted for management of blood sugars in setting of dexamethasone therapy, appreciate recommendations    #HLD  :: On home atorvastatin 40mg daily  - Continue atorvastatin    #Tobacco use  - Nicotine patch    #Constipation  - Miralax 34mg BID  - Senna BID    F: PRN  E: PRN  N: Diabetic diet  A: PIV  GI: Protonix 40 daily  DVT: SCDS  NOK:  ольга   Code status Full code    Melchor Abraham MD  PGY1 Neurology    Case discussed with on-call attending.      Attestation:   Note Completion:  I am a:  Resident/Fellow   Attending Attestation I saw  and evaluated the patient.  I personally obtained the key and critical portions of the history and physical exam or was physically present for key and  critical portions performed by the resident/fellow. I reviewed the resident/fellow?s documentation and discussed the patient with the resident/fellow.  I agree with the resident/fellow?s medical decision making as documented in their note  with the exception/addition of the following:    I personally evaluated the patient on 28-Sep-2023   Comments/ Additional Findings    TEACHING ATTENDING ATTESTATION    I saw this patient with the Resident, total face to face time was 65 minutes  History and physical examination were reviewed in the presence of the patient. More than half of this time was spent on coordination of care and patient education. I personally obtained the key and critical portions of the H&P.  The pertinent aspects of the history were: As above.  The pertinent aspects of the examination were: As above.  I discussed diagnosis and treatment plan with the resident and the diagnosis and plan as documented reflects accurately my opinion.  I wish to emphasize: d/c home today with/home hospice          Electronic Signatures:  Matthew Gomes)  (Signed 28-Sep-2023 14:00)   Authored: Note Completion   Co-Signer: Service, Subjective Data, Objective Data, Assessment and Plan, Note Completion  Melchor Abraham (Resident))  (Signed 28-Sep-2023 13:17)   Authored: Service, Subjective Data, Objective Data, Assessment  and Plan, Note Completion      Last Updated: 28-Sep-2023 14:00 by Matthew Gomes)

## 2023-09-30 NOTE — PROGRESS NOTES
Service: Epilepsy     Subjective Data:   NADER MAHMOOD is a 63 year old Female who is Hospital Day # 4.     Neurology re-consulted for AMS and nausea. On arrival, pt noted to be minimally verbal with a forced left gaze deviation. CTH showing worsened LF hemorrhagic mass.    Objective Data:     Objective Information:      T   P  R  BP   MAP  SpO2   Value  36.9  76  16  160/91      100%  Date/Time 9/27 5:22 9/27 5:22 9/27 5:22 9/27 5:22    9/27 5:22  Range  (35.9C - 36.9C )  (57 - 96 )  (16 - 18 )  (135 - 164 )/ (76 - 96 )    (97% - 100% )  Highest temp of 36.9 C was recorded at 9/27 1:17      Pain reported at 9/27 8:09: sleeping    Physical Exam Narrative:  ·  Physical Exam:    GENERAL APPEARANCE:  No distress, alert, minimally verbal.    MENTAL STATE:   Non-responsive to verbal and tactile stimuli. Grimaces and cries out to nox stim.     CRANIAL NERVES:   CN 2   Unable to assess given AMS.  CN 3, 4, 6   Notable forced left gaze deviation. Unable to cross midline.   CN 5   Facial sensation unable to be assessed given AMS.  CN 7   Normal and symmetric facial strength. Nasolabial folds symmetric.   CN 8   Unable to assess given AMS.  CN 9   Unable to assess given AMS.  CN 11   Unable to assess given AMS.  CN 12   Unable to assess given AMS.    MOTOR:   No fasciculations, tremor or other abnormal movements were present.   Pt moving LUE and LLE spontaneously anti-gravity.  RUE and RLE flaccid.    SENSORY:   Unable to assess given AMS. Wd to nox stim in LUE and LLE.    COORDINATION:  Unable to assess given AMS.    GAIT:   Deferred.    Recent Lab Results:    Results:        I have reviewed these laboratory results:    Glucose_POCT  Trending View      Result 25-Sep-2023 14:38:00  25-Sep-2023 10:43:00  25-Sep-2023 05:34:00  25-Sep-2023 01:26:00  24-Sep-2023 20:52:00    Glucose-POCT 201   H   215   H   281   H   271   H   174   H        Complete Blood Count + Differential  24-Sep-2023 17:00:00      Result Value     White Blood Cell Count  14.0   H   Nucleated Erythrocyte Count  0.0    Red Blood Cell Count  4.99    HGB  15.5    HCT  44.6    MCV  89    MCHC  34.8    PLT  311    RDW-CV  12.4    Neutrophil %  85.8    Immature Granulocytes %  1.6   H   Lymphocyte %  6.9    Monocyte %  4.8    Eosinophil %  0.7    Basophil %  0.2    Neutrophil Count  11.97   H   Lymphocyte Count  0.96   L   Monocyte Count  0.67    Eosinophil Count  0.10    Basophil Count  0.03      Renal Function Panel  24-Sep-2023 17:00:00      Result Value    Glucose, Serum  245   H   NA  141    K  4.5    CL  97   L   Bicarbonate, Serum  37   H   Anion Gap, Serum  12    BUN  16    CREAT  0.74    GFR Female  >90    Calcium, Serum  9.7    Phosphorus, Serum  2.9    ALB  3.8        Assessment and Plan:   Code Status:  ·  Code Status Full Code     Assessment:    Cristiane Harvey is a 63F with PMHx HTN, DLD, NID-T2DM, tobacco-use, and NSCLC with numerous CNS mets diagnosed about two weeks ago, from which she  has right-sided hemiplegia. Epilepsy initially consulted for a witnessed acute and brief paroxysmal episode of R hand clonic jerking  movements at home with preserved level-of-consciousness. Neuroimaging revealed progression of left frontal hyperdensity with surrounding  attenuation consistent with evolution of that mass with vasogenic edema. Suspected acute symptomatic focal seizure /status in the setting of met evolution and decadron wean.     Neurology re-engaged on 9/27 AM for worsening mental status. Patient had persistent left gaze on exam and neurology called for c/f  seizure. CTH obtained showed multiple intracranial hemorrhagic lesions worsened compared to prior on personal review.    RECOMMENDATIONS  - Continue Keppra 750mg IV BID  - Will follow up final read of CTH  - Recommend NSGY consult for worsening hemorrhagic metastases  - Strongly recommend GOC conversation to discuss prognosis.     This patient will be staffed with the attending, Dr. Pacheco, in  the morning.    Howie Cee MD  PGY-2, Neurology    -----------------------------------------------------------------  Senior Resident Addendum:  I participated and contributed to the above note including the HPI, physical exam, assessment, and plan. I agree with the above information.    Jose Maria Ng  PGY4 Neurology     Attestation:   Note Completion:  I am a:  Resident/Fellow   Attending Attestation I saw and evaluated the patient.  I personally obtained the key and critical portions of the history and physical exam or was physically present for key and  critical portions performed by the resident/fellow. I reviewed the resident/fellow?s documentation and discussed the patient with the resident/fellow.  I agree with the resident/fellow?s medical decision making as documented in the note.     I personally evaluated the patient on 27-Sep-2023   Comments/ Additional Findings    I saw and examined the patient, and agree with the note above. Minor edits made to reflect additional information. I spent 35 minutes were spent  reviewing this patient's chart, medications, vitals, labs, diagnostic testing, and performing the physical exam, 50% of this time was spent on providing counseling and coordination of care.          Electronic Signatures:  Bandar Coy)  (Signed 28-Sep-2023 23:47)   Authored: Assessment and Plan, Note Completion   Co-Signer: Assessment and Plan, Note Completion  Kishor Ng (Resident))  (Signed 27-Sep-2023 08:43)   Authored: Assessment and Plan, Note Completion  Howie Cee (Resident))  (Signed 27-Sep-2023 09:48)   Authored: Service, Subjective Data, Objective Data, Assessment  and Plan      Last Updated: 28-Sep-2023 23:47 by Bandar Coy)

## 2023-10-03 NOTE — DISCHARGE SUMMARY
Discharge Diagnosis  Metastatic lung cancer    Issues Requiring Follow-Up  none    Test Results Pending At Discharge  Pending Labs       No current pending labs.            Hospital Course  63 y.o. F with PMHx recent admission for suspected NSCLC and hemorrhagic brain mets s/p initiation of radiation to spine 9/22, discharged 9/23, readmitted for persistent weakness of RUE and RLE resulting in fall at home c/f possible seizure. Acutely worsened 9/27 with repeat CTH showing brain mets inc in sie with more bleeding and swelling, family opted for hospice care.    Pertinent Physical Exam At Time of Discharge  Physical Exam  Constitutional:  NAD, unresponsive  CV: RRR, no murmurs, rubs, or gallops  Pulm: no increased WOB  GI: ND  Ext: bilateral LE without pitting edema  Neuro: Unable to assess. Spontaneously moving left side of body.  Psych: ANOx0.    Home Medications     Medication List      You have not been prescribed any medications.       Outpatient Follow-Up  Future Appointments   Date Time Provider Department Center   10/11/2023  3:00 PM Jaida Rodríguez MD [id number] [facility]   10/24/2023  9:30 AM Vianney Webber PA-C [id number] [facility]       Deep Red MD

## 2023-10-11 ENCOUNTER — APPOINTMENT (OUTPATIENT)
Dept: HEMATOLOGY/ONCOLOGY | Facility: CLINIC | Age: 63
End: 2023-10-11
Payer: COMMERCIAL

## 2023-10-20 ENCOUNTER — TELEPHONE (OUTPATIENT)
Dept: CASE MANAGEMENT | Facility: HOSPITAL | Age: 63
End: 2023-10-20

## 2023-10-24 ENCOUNTER — APPOINTMENT (OUTPATIENT)
Dept: ENDOCRINOLOGY | Facility: CLINIC | Age: 63
End: 2023-10-24
Payer: COMMERCIAL

## 2023-10-25 ENCOUNTER — SOCIAL WORK (OUTPATIENT)
Dept: CASE MANAGEMENT | Facility: HOSPITAL | Age: 63
End: 2023-10-25
Payer: COMMERCIAL

## 2023-10-25 NOTE — PROGRESS NOTES
This SW was asked to assist with completion of FMLA paperwork for pt's son on 10/24/23. This SW spoke with son who reported that pt  on  at [facility] hosp[facility] [place].  He needs [place] to cover the dates pt was inpt 23-23. SW working with treating resident's attending physician to complete/sign the form.    Signed document emailed securely to pt's son as requested this date.

## 2023-11-02 ENCOUNTER — TELEPHONE (OUTPATIENT)
Dept: RADIATION ONCOLOGY | Facility: HOSPITAL | Age: 63
End: 2023-11-02